# Patient Record
Sex: FEMALE | Race: WHITE | NOT HISPANIC OR LATINO | Employment: OTHER | URBAN - METROPOLITAN AREA
[De-identification: names, ages, dates, MRNs, and addresses within clinical notes are randomized per-mention and may not be internally consistent; named-entity substitution may affect disease eponyms.]

---

## 2018-04-24 ENCOUNTER — CONSULTATION (OUTPATIENT)
Dept: URBAN - METROPOLITAN AREA CLINIC 11 | Facility: CLINIC | Age: 68
End: 2018-04-24

## 2018-04-24 VITALS — SYSTOLIC BLOOD PRESSURE: 120 MMHG | DIASTOLIC BLOOD PRESSURE: 80 MMHG | HEIGHT: 55 IN

## 2018-04-24 ASSESSMENT — TONOMETRY
OD_IOP_MMHG: 14
OS_IOP_MMHG: 18

## 2018-04-24 ASSESSMENT — VISUAL ACUITY
OS_CC: 20/25-1
OD_CC: 20/25

## 2018-04-24 NOTE — PROCEDURE NOTE: CLINICAL
PROCEDURE NOTE: Avastin () #1 OD. Anesthesia: Topical. Prep: Betadine Drops and Betadine Scrub. Intravitreal injection of Avastin 1.25mg/0.05 ml was given. Injection site: 3-4 mm from the limbus. Patient tolerated procedure well. CF vision checked. There were no complications. Post-op instructions given. Lot #: [email or web address]. Expiration Date: Mon Jun 25 2018 00:00:00 [facility]-[address] Daylight Time). Inventory Id: null. Stuart Angel

## 2018-05-29 ENCOUNTER — FOLLOW UP (OUTPATIENT)
Dept: URBAN - METROPOLITAN AREA CLINIC 11 | Facility: CLINIC | Age: 68
End: 2018-05-29

## 2018-05-29 ASSESSMENT — VISUAL ACUITY
OD_CC: 20/25
OS_CC: 20/25

## 2018-05-29 ASSESSMENT — TONOMETRY: OD_IOP_MMHG: 16

## 2018-05-29 NOTE — PROCEDURE NOTE: CLINICAL
PROCEDURE NOTE: Avastin () #2 OD. Diagnosis: Neovascular AMD with Active CNV. Anesthesia: Topical/Subconjunctival. Prep: Betadine Drops and Betadine Scrub. Intravitreal injection of Avastin 1.25mg/0.05 ml was given. Injection site: 3-4 mm from the limbus. Patient tolerated procedure well. CF vision checked. There were no complications. Post-op instructions given. Lot #: [email or web address]. Expiration Date: Thu Jul 19 2018 00:00:00 [facility]-[address] Daylight Time). Inventory Id: ana Wellington

## 2018-08-02 ENCOUNTER — FOLLOW UP (OUTPATIENT)
Dept: URBAN - METROPOLITAN AREA CLINIC 11 | Facility: CLINIC | Age: 68
End: 2018-08-02

## 2018-08-02 ASSESSMENT — VISUAL ACUITY
OD_CC: 20/20
OS_CC: 20/20-1

## 2018-08-02 ASSESSMENT — TONOMETRY: OD_IOP_MMHG: 12

## 2018-08-02 NOTE — PROCEDURE NOTE: CLINICAL
PROCEDURE NOTE: Avastin () #4 OD. Diagnosis: Neovascular AMD with Active CNV. Anesthesia: Topical/Subconjunctival. Prep: Betadine Drops and Betadine Scrub. Intravitreal injection of Avastin 1.25mg/0.05 ml was given. Injection site: 3-4 mm from the limbus. Patient tolerated procedure well. CF vision checked. There were no complications. Post-op instructions given. Lot #: [email or web address]. Expiration Date: . EXP. Inventory Id: null. Aba Reese

## 2018-12-13 ENCOUNTER — FOLLOW UP (OUTPATIENT)
Dept: URBAN - METROPOLITAN AREA CLINIC 11 | Facility: CLINIC | Age: 68
End: 2018-12-13

## 2018-12-13 ASSESSMENT — TONOMETRY
OD_IOP_MMHG: 18
OS_IOP_MMHG: 18

## 2018-12-13 ASSESSMENT — VISUAL ACUITY
OS_CC: 20/25
OD_CC: 20/25-1

## 2018-12-13 NOTE — PROCEDURE NOTE: CLINICAL
PROCEDURE NOTE: Eylea #1 OD. Diagnosis: Neovascular AMD with Active CNV. Anesthesia: Topical/Subconjunctival. Prep: Betadine Flush. Intravitreal injection of Eylea 2mg/0.05 ml was given. Injection site: 3-4 mm from the limbus. Patient tolerated procedure well. CF vision checked. There were no complications. Post procedure instructions given. Lot #: [id number]. Expiration Date: Wed May 01 2019 00:00:00 [facility] Day[facility] Time). Inventory Id: ana Zaman

## 2018-12-13 NOTE — PATIENT DISCUSSION
Greater then 50% of exam spent in face to face dialogue with Mrs. Bri Hughes and her . I have recommended switching her over to [facility], due to the poor response to multiple Avastin injections. There is an increase in SRF seen on exam and OCT today in the right eye following an injection 1 month ago. Mrs. Bri Hughes understands the need for treatment and would like to proceed.

## 2018-12-13 NOTE — PATIENT DISCUSSION
Jacinto recommended today after discussion of benefits, risks and alternatives. The injection was given and tolerated well by the patient. Post-injection instructions were reviewed and understood by the patient.

## 2019-01-24 ENCOUNTER — FOLLOW UP (OUTPATIENT)
Dept: URBAN - METROPOLITAN AREA CLINIC 11 | Facility: CLINIC | Age: 69
End: 2019-01-24

## 2019-01-24 ASSESSMENT — TONOMETRY: OD_IOP_MMHG: 14

## 2019-01-24 ASSESSMENT — VISUAL ACUITY: OS_CC: 20/25-2

## 2019-01-24 NOTE — PROCEDURE NOTE: CLINICAL
PROCEDURE NOTE: Eylea #2 OD. Diagnosis: Neovascular AMD with Active CNV. Anesthesia: Topical/Subconjunctival. Prep: Betadine Flush. Intravitreal injection of Eylea 2mg/0.05 ml was given. Injection site: 3-4 mm from the limbus. Patient tolerated procedure well. CF vision checked. There were no complications. Post procedure instructions given. Lot #: [id number]. Expiration Date: Mon Jul 01 2019 00:00:[address] Daylight Time). Inventory Id: ana Marte

## 2019-01-24 NOTE — PATIENT DISCUSSION
Greater then 50% of exam spent in face to face dialogue with Mrs. Hector Damico and her . Due to the excelent response seen on her OCT done today, I have recommended continuing with an injection of Eylea,  She agrees to do another in 0ne month in her right eye.

## 2019-04-04 ENCOUNTER — FOLLOW UP (OUTPATIENT)
Dept: URBAN - METROPOLITAN AREA CLINIC 11 | Facility: CLINIC | Age: 69
End: 2019-04-04

## 2019-04-04 ASSESSMENT — TONOMETRY
OD_IOP_MMHG: 17
OS_IOP_MMHG: 21

## 2019-04-04 ASSESSMENT — VISUAL ACUITY
OS_CC: 20/25-1
OD_CC: 20/25

## 2019-04-04 NOTE — PROCEDURE NOTE: CLINICAL
PROCEDURE NOTE: Eylea #4 OD. Diagnosis: Neovascular AMD with Active CNV. Anesthesia: Topical/Subconjunctival. Prep: Betadine Flush. Intravitreal injection of Eylea 2mg/0.05 ml was given. Injection site: 3-4 mm from the limbus. Patient tolerated procedure well. CF vision checked. There were no complications. Post procedure instructions given. Lot #: [id number]. Expiration Date: Fri Nov 01 2019 00:00:00 [facility] Daylight Time). Inventory Id: ana Gonzalez

## 2019-07-25 ENCOUNTER — FOLLOW UP (OUTPATIENT)
Dept: URBAN - METROPOLITAN AREA CLINIC 11 | Facility: CLINIC | Age: 69
End: 2019-07-25

## 2019-07-25 ASSESSMENT — VISUAL ACUITY
OS_CC: 20/25-2
OD_CC: 20/20-2

## 2019-07-25 ASSESSMENT — TONOMETRY
OS_IOP_MMHG: 16
OD_IOP_MMHG: 19

## 2019-07-25 NOTE — PROCEDURE NOTE: CLINICAL
PROCEDURE NOTE: Eylea #7 OD. Diagnosis: Neovascular AMD with Active CNV. Anesthesia: Topical/Subconjunctival. Prep: Betadine Flush. Intravitreal injection of Eylea 2mg/0.05 ml was given. Injection site: 3-4 mm from the limbus. Patient tolerated procedure well. CF vision checked. There were no complications. Post procedure instructions given. Lot #: [id number]. Expiration Date: Sun Mar 01 2020 00:00:00 [facility]-[address] [place] [address] Standard Time). Inventory Id: ana Keller

## 2019-10-10 ENCOUNTER — FOLLOW UP (OUTPATIENT)
Dept: URBAN - METROPOLITAN AREA CLINIC 11 | Facility: CLINIC | Age: 69
End: 2019-10-10

## 2019-10-10 NOTE — PATIENT DISCUSSION
Jacinto #9 recommended today for the right eye. I will see her again in 6 weeks for re-evaluation with the possibility of an injection.

## 2019-10-10 NOTE — PROCEDURE NOTE: CLINICAL
PROCEDURE NOTE: Eylea #9 OD. Diagnosis: Neovascular AMD with Active CNV. Anesthesia: Topical/Subconjunctival. Prep: Betadine Flush. Intravitreal injection of Eylea 2mg/0.05 ml was given. Injection site: 3-4 mm from the limbus. Patient tolerated procedure well. CF vision checked. There were no complications. Post procedure instructions given. Lot #: [id number]. Expiration Date: 7/1/2020. Inventory Id: ana Duarte

## 2019-10-16 ASSESSMENT — VISUAL ACUITY
OS_CC: 20/25+2
OD_CC: 20/20-1

## 2019-10-16 ASSESSMENT — TONOMETRY: OD_IOP_MMHG: 18

## 2019-11-21 ENCOUNTER — FOLLOW UP (OUTPATIENT)
Dept: URBAN - METROPOLITAN AREA CLINIC 11 | Facility: CLINIC | Age: 69
End: 2019-11-21

## 2019-11-21 ASSESSMENT — VISUAL ACUITY
OS_CC: 20/25+2
OD_CC: 20/25+1

## 2019-11-21 ASSESSMENT — TONOMETRY: OD_IOP_MMHG: 15

## 2019-11-21 NOTE — PATIENT DISCUSSION
There is no recurrence of intraretinal or subretinal fluid on spectral domain OCT today. Defer treatment today.

## 2020-01-09 ENCOUNTER — FOLLOW UP (OUTPATIENT)
Dept: URBAN - METROPOLITAN AREA CLINIC 11 | Facility: CLINIC | Age: 70
End: 2020-01-09

## 2020-01-09 ASSESSMENT — VISUAL ACUITY
OD_CC: 20/20-1
OS_CC: 20/20-1

## 2020-01-09 ASSESSMENT — TONOMETRY
OD_IOP_MMHG: 20
OS_IOP_MMHG: 21

## 2020-01-09 NOTE — PATIENT DISCUSSION
Based on today’s exam, diagnostic studies, and/or review of records, the determination was made for no treatment today.

## 2020-03-03 ENCOUNTER — FOLLOW UP (OUTPATIENT)
Dept: URBAN - METROPOLITAN AREA CLINIC 11 | Facility: CLINIC | Age: 70
End: 2020-03-03

## 2020-03-03 ASSESSMENT — VISUAL ACUITY
OS_CC: 20/25+2
OD_CC: 20/25+2

## 2020-03-03 ASSESSMENT — TONOMETRY: OD_IOP_MMHG: 12

## 2020-03-03 NOTE — PATIENT DISCUSSION
No treatment needed today. I have asked that she return in 3 months. She is to call if she has any problems or concerns before then.

## 2020-06-04 ENCOUNTER — FOLLOW UP (OUTPATIENT)
Dept: URBAN - METROPOLITAN AREA CLINIC 11 | Facility: CLINIC | Age: 70
End: 2020-06-04

## 2020-06-04 ASSESSMENT — TONOMETRY
OS_IOP_MMHG: 16
OD_IOP_MMHG: 16

## 2020-06-04 ASSESSMENT — VISUAL ACUITY
OD_CC: 20/25-1
OS_CC: 20/30+1

## 2020-08-19 NOTE — PROCEDURE NOTE: SURGICAL
<p>Prior to commencing surgery patient identification, surgical procedure, site, and side were confirmed by Dr. Mitul De Leon. Following topical proparacaine anesthesia, the patient was positioned at the YAG laser, a contact lens coupled to the cornea with methylcellulose and an axial posterior capsulotomy performed without complication using 3.6 Mj x 31. Excess methylcellulose was washed from the eye, one drop of Alphagan was instilled and the patient returned to the holding area having tolerated the procedure well and without complication. </p><p>MRN:[id number]</p>

## 2020-10-08 ENCOUNTER — FOLLOW UP (OUTPATIENT)
Dept: URBAN - METROPOLITAN AREA CLINIC 11 | Facility: CLINIC | Age: 70
End: 2020-10-08

## 2020-10-08 ASSESSMENT — VISUAL ACUITY
OS_CC: 20/25-2
OD_CC: 20/20-1

## 2020-10-08 ASSESSMENT — TONOMETRY
OS_IOP_MMHG: 13
OD_IOP_MMHG: 14

## 2021-02-04 ENCOUNTER — FOLLOW UP (OUTPATIENT)
Dept: URBAN - METROPOLITAN AREA CLINIC 11 | Facility: CLINIC | Age: 71
End: 2021-02-04

## 2021-02-04 ASSESSMENT — TONOMETRY
OS_IOP_MMHG: 15
OD_IOP_MMHG: 15

## 2021-02-04 ASSESSMENT — VISUAL ACUITY
OD_CC: 20/20-1
OS_CC: 20/25-2

## 2021-02-04 NOTE — PATIENT DISCUSSION
35 minutes spent in dialogue face to face with patient.  Reassured patient that there is no leakage or edema present today.

## 2021-09-23 ENCOUNTER — FOLLOW UP (OUTPATIENT)
Dept: URBAN - METROPOLITAN AREA CLINIC 11 | Facility: CLINIC | Age: 71
End: 2021-09-23

## 2021-09-23 DIAGNOSIS — H43.313: ICD-10-CM

## 2021-09-23 DIAGNOSIS — H43.813: ICD-10-CM

## 2021-09-23 DIAGNOSIS — H35.3121: ICD-10-CM

## 2021-09-23 DIAGNOSIS — H35.3212: ICD-10-CM

## 2021-09-23 PROCEDURE — 92134 CPTRZ OPH DX IMG PST SGM RTA: CPT

## 2021-09-23 PROCEDURE — 92201 OPSCPY EXTND RTA DRAW UNI/BI: CPT

## 2021-09-23 PROCEDURE — 99214 OFFICE O/P EST MOD 30 MIN: CPT

## 2021-09-23 ASSESSMENT — VISUAL ACUITY
OD_CC: 20/20-2
OS_CC: 20/30+2

## 2021-09-23 ASSESSMENT — TONOMETRY
OS_IOP_MMHG: 16
OD_IOP_MMHG: 16

## 2021-09-23 NOTE — PATIENT DISCUSSION
35 minutes spent in dialogue face to face with patient.  Defer injection today. We discussed her cataract progression and I have asked her to check in with you when she desires to proceed with cataract management and surgery. She is cleared for cataract surgery in her left eye.

## 2022-03-22 ENCOUNTER — COMPREHENSIVE EXAM (OUTPATIENT)
Dept: URBAN - METROPOLITAN AREA CLINIC 11 | Facility: CLINIC | Age: 72
End: 2022-03-22

## 2022-03-22 DIAGNOSIS — H35.3212: ICD-10-CM

## 2022-03-22 DIAGNOSIS — H35.3121: ICD-10-CM

## 2022-03-22 PROCEDURE — 92134 CPTRZ OPH DX IMG PST SGM RTA: CPT

## 2022-03-22 PROCEDURE — 99214 OFFICE O/P EST MOD 30 MIN: CPT

## 2022-03-22 ASSESSMENT — TONOMETRY
OD_IOP_MMHG: 18
OS_IOP_MMHG: 15

## 2022-03-22 ASSESSMENT — VISUAL ACUITY
OS_CC: 20/25+3
OD_CC: 20/30+1

## 2022-03-22 NOTE — PATIENT DISCUSSION
Discussed AREDS supplements, BP Control, and dark leafy green vegetables. [FreeTextEntry3] : discussed with PA

## 2022-03-22 NOTE — PATIENT DISCUSSION
45 minutes spent in dialogue face to face with patient.  Defer injection today. We discussed her cataract progression and I have asked her to check in with you when she desires to proceed with cataract management and surgery. She is cleared for cataract surgery in her left eye.

## 2022-07-03 RX ORDER — ESTRADIOL 0.1 MG/G
CREAM VAGINAL
COMMUNITY

## 2022-07-03 RX ORDER — ROSUVASTATIN CALCIUM 5 MG/1
TABLET, COATED ORAL
COMMUNITY

## 2022-07-03 RX ORDER — SIMVASTATIN 20 MG
TABLET ORAL
COMMUNITY

## 2022-07-03 RX ORDER — FLUTICASONE PROPIONATE 50 MCG
SPRAY, SUSPENSION (ML) NASAL
COMMUNITY

## 2022-07-03 RX ORDER — MONTELUKAST SODIUM 5 MG/1
TABLET, CHEWABLE ORAL
COMMUNITY

## 2022-07-17 PROBLEM — N39.42 INCONTINENCE WITHOUT SENSORY AWARENESS: Status: ACTIVE | Noted: 2022-07-17

## 2022-07-17 PROBLEM — N39.3 STRESS INCONTINENCE IN FEMALE: Status: ACTIVE | Noted: 2022-07-17

## 2022-07-17 PROBLEM — N39.41 URGE INCONTINENCE OF URINE: Status: ACTIVE | Noted: 2022-07-17

## 2022-09-22 ENCOUNTER — COMPREHENSIVE EXAM (OUTPATIENT)
Dept: URBAN - METROPOLITAN AREA CLINIC 11 | Facility: CLINIC | Age: 72
End: 2022-09-22

## 2022-09-22 DIAGNOSIS — H35.3121: ICD-10-CM

## 2022-09-22 DIAGNOSIS — H43.313: ICD-10-CM

## 2022-09-22 DIAGNOSIS — H43.813: ICD-10-CM

## 2022-09-22 DIAGNOSIS — H35.362: ICD-10-CM

## 2022-09-22 DIAGNOSIS — H35.3212: ICD-10-CM

## 2022-09-22 PROCEDURE — 92201 OPSCPY EXTND RTA DRAW UNI/BI: CPT

## 2022-09-22 PROCEDURE — 99214 OFFICE O/P EST MOD 30 MIN: CPT

## 2022-09-22 PROCEDURE — 92134 CPTRZ OPH DX IMG PST SGM RTA: CPT

## 2022-09-22 ASSESSMENT — VISUAL ACUITY
OD_PH: 20/30-2
OS_CC: 20/25-2
OD_CC: 20/50

## 2022-09-22 ASSESSMENT — TONOMETRY
OD_IOP_MMHG: 19
OS_IOP_MMHG: 12

## 2023-10-24 ENCOUNTER — COMPREHENSIVE EXAM (OUTPATIENT)
Dept: URBAN - METROPOLITAN AREA CLINIC 11 | Facility: CLINIC | Age: 73
End: 2023-10-24

## 2023-10-24 DIAGNOSIS — H35.3122: ICD-10-CM

## 2023-10-24 DIAGNOSIS — H35.3211: ICD-10-CM

## 2023-10-24 DIAGNOSIS — H43.813: ICD-10-CM

## 2023-10-24 DIAGNOSIS — H43.313: ICD-10-CM

## 2023-10-24 PROCEDURE — 67028 INJECTION EYE DRUG: CPT

## 2023-10-24 PROCEDURE — 99214 OFFICE O/P EST MOD 30 MIN: CPT

## 2023-10-24 PROCEDURE — 92134 CPTRZ OPH DX IMG PST SGM RTA: CPT

## 2023-10-24 ASSESSMENT — VISUAL ACUITY
OS_CC: 20/20-1
OD_CC: 20/40-2

## 2023-10-24 ASSESSMENT — TONOMETRY
OD_IOP_MMHG: 20
OS_IOP_MMHG: 17

## 2023-11-28 ENCOUNTER — CLINIC PROCEDURE ONLY (OUTPATIENT)
Dept: URBAN - METROPOLITAN AREA CLINIC 11 | Facility: CLINIC | Age: 73
End: 2023-11-28

## 2023-11-28 DIAGNOSIS — H35.3211: ICD-10-CM

## 2023-11-28 PROCEDURE — 67028 INJECTION EYE DRUG: CPT

## 2023-11-28 ASSESSMENT — VISUAL ACUITY
OU_CC: 20/20
OD_CC: 20/50
OS_CC: 20/20

## 2023-11-28 ASSESSMENT — TONOMETRY
OD_IOP_MMHG: 20
OS_IOP_MMHG: 18

## 2024-01-16 ENCOUNTER — ESTABLISHED PATIENT (OUTPATIENT)
Dept: URBAN - METROPOLITAN AREA CLINIC 11 | Facility: CLINIC | Age: 74
End: 2024-01-16

## 2024-01-16 DIAGNOSIS — H35.3122: ICD-10-CM

## 2024-01-16 DIAGNOSIS — H43.313: ICD-10-CM

## 2024-01-16 DIAGNOSIS — H35.3211: ICD-10-CM

## 2024-01-16 PROCEDURE — 92134 CPTRZ OPH DX IMG PST SGM RTA: CPT

## 2024-01-16 PROCEDURE — 99213 OFFICE O/P EST LOW 20 MIN: CPT

## 2024-01-16 ASSESSMENT — TONOMETRY
OS_IOP_MMHG: 17
OD_IOP_MMHG: 17

## 2024-01-16 ASSESSMENT — VISUAL ACUITY
OD_CC: 20/30
OS_CC: 20/20

## 2024-03-26 ENCOUNTER — ESTABLISHED PATIENT (OUTPATIENT)
Dept: URBAN - METROPOLITAN AREA CLINIC 11 | Facility: CLINIC | Age: 74
End: 2024-03-26

## 2024-03-26 DIAGNOSIS — H43.313: ICD-10-CM

## 2024-03-26 DIAGNOSIS — H43.813: ICD-10-CM

## 2024-03-26 DIAGNOSIS — H35.3211: ICD-10-CM

## 2024-03-26 DIAGNOSIS — H35.3122: ICD-10-CM

## 2024-03-26 PROCEDURE — 92134 CPTRZ OPH DX IMG PST SGM RTA: CPT

## 2024-03-26 PROCEDURE — 99213 OFFICE O/P EST LOW 20 MIN: CPT

## 2024-03-26 ASSESSMENT — VISUAL ACUITY
OS_CC: 20/20
OU_CC: 20/20
OD_CC: 20/30+1

## 2024-03-26 ASSESSMENT — TONOMETRY
OD_IOP_MMHG: 21
OS_IOP_MMHG: 18

## 2024-05-14 ENCOUNTER — FOLLOW UP (OUTPATIENT)
Dept: URBAN - METROPOLITAN AREA CLINIC 11 | Facility: CLINIC | Age: 74
End: 2024-05-14

## 2024-05-14 DIAGNOSIS — H35.3211: ICD-10-CM

## 2024-05-14 DIAGNOSIS — H43.313: ICD-10-CM

## 2024-05-14 DIAGNOSIS — H35.3122: ICD-10-CM

## 2024-05-14 DIAGNOSIS — H43.813: ICD-10-CM

## 2024-05-14 PROCEDURE — 99213 OFFICE O/P EST LOW 20 MIN: CPT

## 2024-05-14 PROCEDURE — 92134 CPTRZ OPH DX IMG PST SGM RTA: CPT

## 2024-05-14 ASSESSMENT — VISUAL ACUITY
OS_SC: 20/20
OD_SC: 20/40-2

## 2024-05-14 ASSESSMENT — TONOMETRY
OD_IOP_MMHG: 23
OS_IOP_MMHG: 18

## 2024-07-18 ENCOUNTER — FOLLOW UP (OUTPATIENT)
Dept: URBAN - METROPOLITAN AREA CLINIC 11 | Facility: CLINIC | Age: 74
End: 2024-07-18

## 2024-07-18 DIAGNOSIS — H35.3122: ICD-10-CM

## 2024-07-18 DIAGNOSIS — H43.813: ICD-10-CM

## 2024-07-18 DIAGNOSIS — H35.3211: ICD-10-CM

## 2024-07-18 DIAGNOSIS — H43.313: ICD-10-CM

## 2024-07-18 PROCEDURE — 92134 CPTRZ OPH DX IMG PST SGM RTA: CPT

## 2024-07-18 PROCEDURE — 99213 OFFICE O/P EST LOW 20 MIN: CPT

## 2024-07-18 ASSESSMENT — VISUAL ACUITY
OS_CC: 20/20
OD_PH: 20/40-1
OD_CC: 20/60-1

## 2024-07-18 ASSESSMENT — TONOMETRY
OD_IOP_MMHG: 18
OS_IOP_MMHG: 16

## 2024-10-10 ENCOUNTER — COMPREHENSIVE EXAM (OUTPATIENT)
Dept: URBAN - METROPOLITAN AREA CLINIC 11 | Facility: CLINIC | Age: 74
End: 2024-10-10

## 2024-10-10 DIAGNOSIS — H35.3122: ICD-10-CM

## 2024-10-10 DIAGNOSIS — H35.3114: ICD-10-CM

## 2024-10-10 DIAGNOSIS — H43.313: ICD-10-CM

## 2024-10-10 DIAGNOSIS — D31.32: ICD-10-CM

## 2024-10-10 DIAGNOSIS — H35.3211: ICD-10-CM

## 2024-10-10 DIAGNOSIS — H43.813: ICD-10-CM

## 2024-10-10 PROCEDURE — 92134 CPTRZ OPH DX IMG PST SGM RTA: CPT

## 2024-10-10 PROCEDURE — 99214 OFFICE O/P EST MOD 30 MIN: CPT

## 2025-04-10 ENCOUNTER — COMPREHENSIVE EXAM (OUTPATIENT)
Age: 75
End: 2025-04-10

## 2025-04-10 DIAGNOSIS — H43.313: ICD-10-CM

## 2025-04-10 DIAGNOSIS — H35.3211: ICD-10-CM

## 2025-04-10 DIAGNOSIS — D31.32: ICD-10-CM

## 2025-04-10 DIAGNOSIS — H43.813: ICD-10-CM

## 2025-04-10 DIAGNOSIS — H35.3122: ICD-10-CM

## 2025-04-10 DIAGNOSIS — H35.3114: ICD-10-CM

## 2025-04-10 PROCEDURE — 99214 OFFICE O/P EST MOD 30 MIN: CPT

## 2025-04-10 PROCEDURE — 92134 CPTRZ OPH DX IMG PST SGM RTA: CPT
